# Patient Record
Sex: FEMALE | Race: WHITE
[De-identification: names, ages, dates, MRNs, and addresses within clinical notes are randomized per-mention and may not be internally consistent; named-entity substitution may affect disease eponyms.]

---

## 2021-04-21 ENCOUNTER — HOSPITAL ENCOUNTER (EMERGENCY)
Dept: HOSPITAL 95 - ER | Age: 61
Discharge: HOME | End: 2021-04-21
Payer: COMMERCIAL

## 2021-04-21 VITALS — HEIGHT: 67 IN | WEIGHT: 145 LBS | BODY MASS INDEX: 22.76 KG/M2
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
## 2021-04-21 DIAGNOSIS — Z87.891: ICD-10-CM

## 2021-04-21 DIAGNOSIS — Z88.5: ICD-10-CM

## 2021-04-21 DIAGNOSIS — Z91.09: ICD-10-CM

## 2021-04-21 DIAGNOSIS — S01.01XA: Primary | ICD-10-CM

## 2021-04-21 PROCEDURE — A9270 NON-COVERED ITEM OR SERVICE: HCPCS

## 2022-02-07 ENCOUNTER — APPOINTMENT (RX ONLY)
Dept: URBAN - NONMETROPOLITAN AREA CLINIC 8 | Facility: CLINIC | Age: 62
Setting detail: DERMATOLOGY
End: 2022-02-07

## 2022-02-07 DIAGNOSIS — L72.0 EPIDERMAL CYST: ICD-10-CM | Status: INADEQUATELY CONTROLLED

## 2022-02-07 PROBLEM — D48.5 NEOPLASM OF UNCERTAIN BEHAVIOR OF SKIN: Status: ACTIVE | Noted: 2022-02-07

## 2022-02-07 PROCEDURE — ? INCISION AND DRAINAGE WITH PATHOLOGY

## 2022-02-07 PROCEDURE — 11402 EXC TR-EXT B9+MARG 1.1-2 CM: CPT

## 2022-02-07 PROCEDURE — 11401 EXC TR-EXT B9+MARG 0.6-1 CM: CPT

## 2022-02-07 PROCEDURE — ? ADDITIONAL NOTES

## 2022-02-07 ASSESSMENT — LOCATION DETAILED DESCRIPTION DERM
LOCATION DETAILED: INFERIOR THORACIC SPINE
LOCATION DETAILED: LEFT POSTERIOR SHOULDER

## 2022-02-07 ASSESSMENT — LOCATION ZONE DERM
LOCATION ZONE: TRUNK
LOCATION ZONE: ARM

## 2022-02-07 ASSESSMENT — LOCATION SIMPLE DESCRIPTION DERM
LOCATION SIMPLE: UPPER BACK
LOCATION SIMPLE: LEFT SHOULDER

## 2022-02-07 NOTE — PROCEDURE: ADDITIONAL NOTES
Detail Level: Simple
Render Risk Assessment In Note?: no
Additional Notes: Cysts were not I & D'ed.  They were surgically excised.

## 2022-02-07 NOTE — PROCEDURE: INCISION AND DRAINAGE WITH PATHOLOGY
Detail Level: Detailed
Lesion Type: Cyst
Method: 15 blade
Curette: No
Include Sutures?: Yes
Anesthesia Type: 1% lidocaine with epinephrine and a 1:10 solution of 8.4% sodium bicarbonate
Anesthesia Volume In Cc: 1.5
Size Of Lesion In Cm (Optional But May Be Required For Some Insurances): 1.4
Wound Care: Petrolatum
Dressing: dry sterile dressing
Lab: [id number]
Lab Facility: [id number]
Hemostasis: Electrocautery
Epidermal Sutures: 4-0 Ethilon
Number Of Epidermal Sutures (Optional): 4
Epidermal Closure: simple interrupted
Suture Text: The incision was partially closed with
Preparation Text: The area was prepped in the usual clean fashion.
Curette Text (Optional): After the contents were expressed a curette was used to partially remove the cyst wall.
Histology Text: Following the procedure a portion of the material was sent for histologic evaluation.
Biopsy Type: H and E
Consent was obtained and risks were reviewed including but not limited to delayed wound healing, infection, need for multiple I and D's, and pain.
Post-Care Instructions: I reviewed with the patient in detail post-care instructions. Patient should keep wound covered and call the office should any redness, pain, swelling or worsening occur.
Billing Type: Third-Party Bill
Size Of Lesion In Cm (Optional But May Be Required For Some Insurances): 0.7
Dressing: pressure dressing
Hemostasis: Pressure

## 2022-02-21 ENCOUNTER — APPOINTMENT (RX ONLY)
Dept: URBAN - NONMETROPOLITAN AREA CLINIC 8 | Facility: CLINIC | Age: 62
Setting detail: DERMATOLOGY
End: 2022-02-21

## 2022-02-21 DIAGNOSIS — L72.0 EPIDERMAL CYST: ICD-10-CM

## 2022-02-21 PROCEDURE — 99211 OFF/OP EST MAY X REQ PHY/QHP: CPT

## 2022-02-21 PROCEDURE — ? SUTURE REMOVAL (NO GLOBAL PERIOD)

## 2022-02-21 ASSESSMENT — LOCATION ZONE DERM
LOCATION ZONE: ARM
LOCATION ZONE: TRUNK

## 2022-02-21 ASSESSMENT — LOCATION DETAILED DESCRIPTION DERM
LOCATION DETAILED: SUPERIOR THORACIC SPINE
LOCATION DETAILED: LEFT ANTERIOR SHOULDER

## 2022-02-21 ASSESSMENT — LOCATION SIMPLE DESCRIPTION DERM
LOCATION SIMPLE: LEFT SHOULDER
LOCATION SIMPLE: UPPER BACK

## 2022-07-30 ENCOUNTER — HOSPITAL ENCOUNTER (EMERGENCY)
Dept: HOSPITAL 95 - ER | Age: 62
Discharge: HOME | End: 2022-07-30
Payer: COMMERCIAL

## 2022-07-30 VITALS — WEIGHT: 131.99 LBS | BODY MASS INDEX: 21.21 KG/M2 | HEIGHT: 66 IN

## 2022-07-30 DIAGNOSIS — I82.812: ICD-10-CM

## 2022-07-30 DIAGNOSIS — I82.412: Primary | ICD-10-CM

## 2022-07-30 DIAGNOSIS — M79.81: ICD-10-CM

## 2022-07-30 DIAGNOSIS — R06.02: ICD-10-CM

## 2022-07-30 DIAGNOSIS — T37.8X5A: ICD-10-CM

## 2022-08-04 ENCOUNTER — HOSPITAL ENCOUNTER (OUTPATIENT)
Dept: HOSPITAL 95 - MHTC | Age: 62
Discharge: HOME | End: 2022-08-04
Attending: RADIOLOGY
Payer: COMMERCIAL

## 2022-08-04 VITALS — BODY MASS INDEX: 21.1 KG/M2 | WEIGHT: 131.26 LBS | HEIGHT: 66 IN

## 2022-08-04 DIAGNOSIS — Z88.1: ICD-10-CM

## 2022-08-04 DIAGNOSIS — Z88.4: ICD-10-CM

## 2022-08-04 DIAGNOSIS — Z88.0: ICD-10-CM

## 2022-08-04 DIAGNOSIS — Z88.8: ICD-10-CM

## 2022-08-04 DIAGNOSIS — Z91.048: ICD-10-CM

## 2022-08-04 DIAGNOSIS — Z88.5: ICD-10-CM

## 2022-08-04 DIAGNOSIS — Z87.891: ICD-10-CM

## 2022-08-04 DIAGNOSIS — Z79.01: ICD-10-CM

## 2022-08-04 DIAGNOSIS — I82.422: ICD-10-CM

## 2022-08-04 DIAGNOSIS — Z88.6: ICD-10-CM

## 2022-08-04 DIAGNOSIS — Z91.038: ICD-10-CM

## 2022-08-04 DIAGNOSIS — Z86.718: ICD-10-CM

## 2022-08-04 DIAGNOSIS — Z88.2: ICD-10-CM

## 2022-08-04 DIAGNOSIS — J45.909: ICD-10-CM

## 2022-08-04 DIAGNOSIS — I82.412: Primary | ICD-10-CM

## 2022-08-04 PROCEDURE — C1725 CATH, TRANSLUMIN NON-LASER: HCPCS

## 2022-08-04 PROCEDURE — C1769 GUIDE WIRE: HCPCS

## 2022-08-04 PROCEDURE — C1753 CATH, INTRAVAS ULTRASOUND: HCPCS

## 2022-08-04 PROCEDURE — C1876 STENT, NON-COA/NON-COV W/DEL: HCPCS

## 2022-08-04 PROCEDURE — C1757 CATH, THROMBECTOMY/EMBOLECT: HCPCS

## 2022-08-04 PROCEDURE — C1887 CATHETER, GUIDING: HCPCS

## 2022-08-04 NOTE — NUR
PT TO RECOVERY ROOM POST PROCEDURE.  PT DROWSY, BUT ANSWERING QUESTIONS
APPROPRIATELY; DENIES PAIN POST PROCEDURE.  MONITOR SR 70'S, B/P 163/104,
AFEBRILE, SPO2 98% RA.  L POPLITEAL SITE NO SWELLING/HEMATOMA, PURSE STING
SUTURES IN PLACE, TEGADERM DRSG INTACT.  PT'S SON AT BEDSIDE ATTENTIVE.

## 2022-08-04 NOTE — NUR
PT AND SON RECEIVED DISCHARGE INSTRUCTIONS, MED LIST AND AFTER CARE
INSTRUCTIONS; VERBALIZED GOOD UNDERSTANDING.  PT LEFT FACILITY VIA W/C,
CONDITION STABLE.

## 2023-01-09 ENCOUNTER — HOSPITAL ENCOUNTER (OUTPATIENT)
Dept: HOSPITAL 95 - ORSCSDS | Age: 63
Discharge: HOME | End: 2023-01-09
Attending: INTERNAL MEDICINE
Payer: COMMERCIAL

## 2023-01-09 VITALS — WEIGHT: 136.03 LBS | HEIGHT: 66 IN | BODY MASS INDEX: 21.86 KG/M2

## 2023-01-09 DIAGNOSIS — Z86.718: ICD-10-CM

## 2023-01-09 DIAGNOSIS — Z79.01: ICD-10-CM

## 2023-01-09 DIAGNOSIS — D12.5: ICD-10-CM

## 2023-01-09 DIAGNOSIS — K57.30: ICD-10-CM

## 2023-01-09 DIAGNOSIS — I10: ICD-10-CM

## 2023-01-09 DIAGNOSIS — Z87.891: ICD-10-CM

## 2023-01-09 DIAGNOSIS — Z12.11: Primary | ICD-10-CM

## 2023-01-09 DIAGNOSIS — D12.2: ICD-10-CM

## 2023-01-09 DIAGNOSIS — D12.0: ICD-10-CM

## 2023-01-09 DIAGNOSIS — Z86.010: ICD-10-CM

## 2023-01-09 PROCEDURE — 0DBK8ZX EXCISION OF ASCENDING COLON, VIA NATURAL OR ARTIFICIAL OPENING ENDOSCOPIC, DIAGNOSTIC: ICD-10-PCS | Performed by: INTERNAL MEDICINE

## 2023-01-09 PROCEDURE — 0DBH8ZX EXCISION OF CECUM, VIA NATURAL OR ARTIFICIAL OPENING ENDOSCOPIC, DIAGNOSTIC: ICD-10-PCS | Performed by: INTERNAL MEDICINE

## 2023-01-09 PROCEDURE — 0DBN8ZX EXCISION OF SIGMOID COLON, VIA NATURAL OR ARTIFICIAL OPENING ENDOSCOPIC, DIAGNOSTIC: ICD-10-PCS | Performed by: INTERNAL MEDICINE

## 2023-04-11 ENCOUNTER — APPOINTMENT (RX ONLY)
Dept: URBAN - NONMETROPOLITAN AREA CLINIC 8 | Facility: CLINIC | Age: 63
Setting detail: DERMATOLOGY
End: 2023-04-11

## 2023-04-11 DIAGNOSIS — D18.0 HEMANGIOMA: ICD-10-CM

## 2023-04-11 DIAGNOSIS — D22 MELANOCYTIC NEVI: ICD-10-CM

## 2023-04-11 DIAGNOSIS — L82.1 OTHER SEBORRHEIC KERATOSIS: ICD-10-CM

## 2023-04-11 DIAGNOSIS — L81.4 OTHER MELANIN HYPERPIGMENTATION: ICD-10-CM

## 2023-04-11 PROBLEM — D18.01 HEMANGIOMA OF SKIN AND SUBCUTANEOUS TISSUE: Status: ACTIVE | Noted: 2023-04-11

## 2023-04-11 PROBLEM — D48.5 NEOPLASM OF UNCERTAIN BEHAVIOR OF SKIN: Status: ACTIVE | Noted: 2023-04-11

## 2023-04-11 PROBLEM — D22.5 MELANOCYTIC NEVI OF TRUNK: Status: ACTIVE | Noted: 2023-04-11

## 2023-04-11 PROCEDURE — 11102 TANGNTL BX SKIN SINGLE LES: CPT

## 2023-04-11 PROCEDURE — ? BIOPSY BY SHAVE METHOD

## 2023-04-11 PROCEDURE — ? COUNSELING

## 2023-04-11 PROCEDURE — 99213 OFFICE O/P EST LOW 20 MIN: CPT | Mod: 25

## 2023-04-11 ASSESSMENT — LOCATION SIMPLE DESCRIPTION DERM
LOCATION SIMPLE: UPPER BACK
LOCATION SIMPLE: ABDOMEN
LOCATION SIMPLE: LEFT FOREARM
LOCATION SIMPLE: LEFT THIGH

## 2023-04-11 ASSESSMENT — LOCATION DETAILED DESCRIPTION DERM
LOCATION DETAILED: INFERIOR THORACIC SPINE
LOCATION DETAILED: LEFT PROXIMAL DORSAL FOREARM
LOCATION DETAILED: PERIUMBILICAL SKIN
LOCATION DETAILED: LEFT ANTERIOR DISTAL THIGH

## 2023-04-11 ASSESSMENT — LOCATION ZONE DERM
LOCATION ZONE: TRUNK
LOCATION ZONE: ARM
LOCATION ZONE: LEG

## 2025-04-16 ENCOUNTER — HOSPITAL ENCOUNTER (EMERGENCY)
Dept: HOSPITAL 95 - ER | Age: 65
Discharge: HOME | End: 2025-04-16
Payer: COMMERCIAL

## 2025-04-16 VITALS — WEIGHT: 125 LBS | BODY MASS INDEX: 20.09 KG/M2 | HEIGHT: 66 IN

## 2025-04-16 VITALS — SYSTOLIC BLOOD PRESSURE: 163 MMHG | DIASTOLIC BLOOD PRESSURE: 106 MMHG

## 2025-04-16 DIAGNOSIS — Z59.89: ICD-10-CM

## 2025-04-16 DIAGNOSIS — M79.10: Primary | ICD-10-CM

## 2025-04-16 SDOH — ECONOMIC STABILITY - INCOME SECURITY: OTHER PROBLEMS RELATED TO HOUSING AND ECONOMIC CIRCUMSTANCES: Z59.89
